# Patient Record
Sex: FEMALE | Race: WHITE
[De-identification: names, ages, dates, MRNs, and addresses within clinical notes are randomized per-mention and may not be internally consistent; named-entity substitution may affect disease eponyms.]

---

## 2020-06-16 ENCOUNTER — HOSPITAL ENCOUNTER (EMERGENCY)
Dept: HOSPITAL 7 - FB.ED | Age: 25
Discharge: HOME | End: 2020-06-16
Payer: COMMERCIAL

## 2020-06-16 DIAGNOSIS — Z88.2: ICD-10-CM

## 2020-06-16 DIAGNOSIS — S62.637A: Primary | ICD-10-CM

## 2020-06-16 DIAGNOSIS — Z79.899: ICD-10-CM
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
## 2020-06-16 PROCEDURE — 99283 EMERGENCY DEPT VISIT LOW MDM: CPT

## 2020-06-16 PROCEDURE — 73140 X-RAY EXAM OF FINGER(S): CPT

## 2020-06-16 NOTE — EDM.PDOC
ED HPI GENERAL MEDICAL PROBLEM





- General


Stated Complaint: LT HAND INJURY


Time Seen by Provider: 06/16/20 17:45


Source of Information: Reports: Patient


History Limitations: Reports: No Limitations





- History of Present Illness


INITIAL COMMENTS - FREE TEXT/NARRATIVE: 





Patient presented to the ED because of a left 2nd and 3rd finger injury. She 

was  cleaning  and scraping a window when all of a sudden the the window frame 

crushed into her fingers. She is able to extend and flex her left 2nd and 3rd 

finger but with pain.





- Related Data


 Allergies











Allergy/AdvReac Type Severity Reaction Status Date / Time


 


Sulfa (Sulfonamide Allergy  Rash Verified 06/16/20 17:21





Antibiotics)     











Home Meds: 


 Home Meds





Levocetirizine Dihydrochloride [Xyzal] 5 mg PO DAILY 06/16/20 [History]


Omega-3 Fatty Acids [Omega-3] 100 mg PO BEDTIME 06/16/20 [History]


Turm/Ging/Kosta/Yuc/Luis/Mikie/Hor [Tumersaid Tablet] 1 each PO BEDTIME 06/16/20 [

History]


Vitamin B Complex 1 each PO BEDTIME 06/16/20 [History]


Zinc 50 mg PO BEDTIME 06/16/20 [History]


traMADol [Ultram] 100 mg PO Q8H PRN #15 tab 06/16/20 [Rx]











Review of Systems





- Review of Systems


Review Of Systems: See Below


Constitutional: Reports: No Symptoms


Eyes: Reports: No Symptoms


Ears: Reports: No Symptoms


Nose: Reports: No Symptoms


Mouth/Throat: Reports: No Symptoms


Respiratory: Reports: No Symptoms


Cardiovascular: Reports: No Symptoms


GI/Abdominal: Reports: No Symptoms


Genitourinary: Reports: No Symptoms


Musculoskeletal: Reports: No Symptoms


Skin: Reports: No Symptoms


Neurological: Reports: No Symptoms





ED EXAM, GENERAL





- Physical Exam


Exam: See Below


Exam Limited By: No Limitations


General Appearance: Alert, No Apparent Distress


Eye Exam: Bilateral Eye: PERRL


Ears: Normal External Exam


Nose: Normal Inspection, Normal Mucosa


Throat/Mouth: Normal Inspection, Normal Lips


Head: Atraumatic, Normocephalic


Neck: Normal Inspection, Supple, Non-Tender


Respiratory/Chest: No Respiratory Distress, Lungs Clear, Normal Breath Sounds


Cardiovascular: Normal Peripheral Pulses, Regular Rate, Rhythm


GI/Abdominal: Normal Bowel Sounds, Soft, Non-Tender


Back Exam: Normal Inspection, Full Range of Motion


Extremities: Normal Inspection, Normal Range of Motion, Other (tender )


Neurological: Alert, Oriented, CN II-XII Intact


Skin Exam: Warm, Dry


Lymphatic: Other (0.5 cm laceration over the left 2nd index finger)





ED TRAUMA EXTREMITY PROCEDURES





- Laceration/Wound Repair


  ** Left Upper Digit - 2nd (Index)


Lac/Wound Length In cm: 0.5


Appearance: Superficial


Distal NVT: Neuro & Vascular Intact


Local Anesthesia - Lidocaine (Xylocaine): 1% Plain


Local Anesthetic Volume: 1cc


Skin Prep: Chlorhexidine (Hibiciens)


Closed With: Other (Wound didn't require suturing)





Course





- Vital Signs


Text/Narrative:: 





Xray-finger tuft fracture-2nd finger


tramadol 100 mg with tylenol 1000 mg po x1





- Orders/Labs/Meds


Orders: 


 Active Orders 24 hr











 Category Date Time Status


 


 Fingers Multiple Lt [CR] Stat Exams  06/16/20 17:06 Taken











Meds: 


Medications














Discontinued Medications














Generic Name Dose Route Start Last Admin





  Trade Name Freq  PRN Reason Stop Dose Admin


 


Acetaminophen  1,000 mg  06/16/20 17:07  06/16/20 17:25





  Tylenol Extra Strength  PO  06/16/20 17:08  1,000 mg





  ONETIME ONE   Administration





     





     





     





     


 


Ibuprofen  800 mg  06/16/20 17:07  





  Motrin  PO  06/16/20 17:08  





  ONETIME ONE   





     





     





     





     


 


Tramadol HCl  100 mg  06/16/20 17:28  06/16/20 17:43





  Ultram  PO  06/16/20 17:29  100 mg





  ONETIME ONE   Administration





     





     





     





     














Departure





- Departure


Time of Disposition: 18:00


Disposition: DC/Tfer to Hospice - Home 50


Condition: Good


Clinical Impression: 


 Closed fracture of tuft of distal phalanx of finger








- Discharge Information


Prescriptions: 


traMADol [Ultram] 100 mg PO Q8H PRN #15 tab


 PRN Reason: Pain


Instructions:  Nail Bed Injury, Easy-to-Read


Referrals: 


PCP,None [Primary Care Provider] - 


Additional Instructions: 


Please read discharge instructions on tuft fracture


Take tramadol 100 mg with tylenol 1000 mg every 8 hours as needed for pain


Aleve 2 tablets every 12 hours as needed for mild pain














- My Orders


Last 24 Hours: 


My Active Orders





06/16/20 17:06


Fingers Multiple Lt [CR] Stat 














- Assessment/Plan


Last 24 Hours: 


My Active Orders





06/16/20 17:06


Fingers Multiple Lt [CR] Stat

## 2020-06-17 NOTE — CR
INDICATION:  Injury. 



LEFT 2ND, 3RD AND 4TH FINGERS:  Three views of the left hand were obtained and 
revealed a slightly comminuted fracture of the ungual tuft of the 2nd finger 
with minimal offset at the fracture site.  



No other significant bone or joint abnormality was identified. 

MTDD